# Patient Record
Sex: FEMALE | Race: WHITE | Employment: FULL TIME | URBAN - METROPOLITAN AREA
[De-identification: names, ages, dates, MRNs, and addresses within clinical notes are randomized per-mention and may not be internally consistent; named-entity substitution may affect disease eponyms.]

---

## 2019-02-06 NOTE — ED INITIAL ASSESSMENT (HPI)
Pt presents to ed via ems c/o anxiety with 3 episodes of vomiting this evening. Pt states she has hx of depression, drank alcohol last night and had a panic attack today.  Pt states she is an everyday drinker, states last drink was at 2200 yesterday evening

## 2019-02-06 NOTE — ED PROVIDER NOTES
Patient Seen in: [facility] Emergency Department    History   Patient presents with:   Anxiety/Panic attack (neurologic)  Nausea/Vomiting/Diarrhea (gastrointestinal)    Stated Complaint: panic attack, n/v    HPI    The patient is a 55-year-old female or homicidal ideation. She does not want to talk to the [facility] regarding detox tonight. No other past medical problems. No history of cardiac disease, PE or DVT.     Past Medical History:   Diagnosis Date   • Anxiety    • Depression        History reviewe and suprapubic regions. No CVA tenderness. Extremities: No deformity, nontender throughout, and normal active range of motion of all 4 extremities. Distal pulses normal and symmetric. No calf swelling, asymmetry, tenderness or cords.   Skin: No masses or type  Anxiety    Disposition:  Discharge  2/5/2019 10:15 pm    Follow-up:  [facility]  [address] [address] [phone]    Call in 1 day          Medications Prescribed:  Current Discharge Medication List    START romaine

## 2019-08-17 ENCOUNTER — OFFICE VISIT (OUTPATIENT)
Dept: OBGYN CLINIC | Facility: CLINIC | Age: 54
End: 2019-08-17
Payer: COMMERCIAL

## 2019-08-17 VITALS
WEIGHT: 190 LBS | TEMPERATURE: 98 F | SYSTOLIC BLOOD PRESSURE: 134 MMHG | HEART RATE: 90 BPM | RESPIRATION RATE: 16 BRPM | DIASTOLIC BLOOD PRESSURE: 100 MMHG | HEIGHT: 67 IN | BODY MASS INDEX: 29.82 KG/M2

## 2019-08-17 DIAGNOSIS — Z01.419 WELL WOMAN EXAM WITH ROUTINE GYNECOLOGICAL EXAM: Primary | ICD-10-CM

## 2019-08-17 DIAGNOSIS — R39.9 URINARY TRACT INFECTION SYMPTOMS: ICD-10-CM

## 2019-08-17 DIAGNOSIS — Z12.39 SCREENING FOR MALIGNANT NEOPLASM OF BREAST: ICD-10-CM

## 2019-08-17 DIAGNOSIS — N92.4 ABNORMAL PERIMENOPAUSAL BLEEDING: ICD-10-CM

## 2019-08-17 DIAGNOSIS — Z12.4 SCREENING FOR MALIGNANT NEOPLASM OF CERVIX: ICD-10-CM

## 2019-08-17 DIAGNOSIS — R03.0 ELEVATED BLOOD PRESSURE READING: ICD-10-CM

## 2019-08-17 LAB
GLUCOSE (URINE DIPSTICK): NEGATIVE MG/DL
MULTISTIX LOT#: NORMAL NUMERIC
NITRITE, URINE: NEGATIVE
PH, URINE: 7 (ref 4.5–8)
PROTEIN (URINE DIPSTICK): 100 MG/DL
SPECIFIC GRAVITY: 1.01 (ref 1–1.03)
UROBILINOGEN,SEMI-QN: 1 MG/DL (ref 0–1.9)

## 2019-08-17 PROCEDURE — 88175 CYTOPATH C/V AUTO FLUID REDO: CPT | Performed by: NURSE PRACTITIONER

## 2019-08-17 PROCEDURE — 87624 HPV HI-RISK TYP POOLED RSLT: CPT | Performed by: NURSE PRACTITIONER

## 2019-08-17 PROCEDURE — 87186 SC STD MICRODIL/AGAR DIL: CPT | Performed by: NURSE PRACTITIONER

## 2019-08-17 PROCEDURE — 87086 URINE CULTURE/COLONY COUNT: CPT | Performed by: NURSE PRACTITIONER

## 2019-08-17 PROCEDURE — 99386 PREV VISIT NEW AGE 40-64: CPT | Performed by: NURSE PRACTITIONER

## 2019-08-17 PROCEDURE — 81002 URINALYSIS NONAUTO W/O SCOPE: CPT | Performed by: NURSE PRACTITIONER

## 2019-08-17 PROCEDURE — 87088 URINE BACTERIA CULTURE: CPT | Performed by: NURSE PRACTITIONER

## 2019-08-17 NOTE — PROGRESS NOTES
Physical/pap/irreular bleeding. Spotting every day since 7/30/2019. PMS days she goes way down. Occ urinary frequency.

## 2019-08-18 LAB — HPV I/H RISK 1 DNA SPEC QL NAA+PROBE: NEGATIVE

## 2019-08-19 RX ORDER — NITROFURANTOIN 25; 75 MG/1; MG/1
100 CAPSULE ORAL 2 TIMES DAILY
Qty: 14 CAPSULE | Refills: 0 | Status: SHIPPED | OUTPATIENT
Start: 2019-08-19 | End: 2019-08-26

## 2019-10-26 ENCOUNTER — HOSPITAL ENCOUNTER (OUTPATIENT)
Dept: MAMMOGRAPHY | Facility: HOSPITAL | Age: 54
Discharge: HOME | End: 2019-10-26
Attending: FAMILY MEDICINE
Payer: COMMERCIAL

## 2019-10-26 DIAGNOSIS — Z12.39 SCREENING FOR MALIGNANT NEOPLASM OF BREAST: ICD-10-CM

## 2019-10-26 PROCEDURE — 77067 SCR MAMMO BI INCL CAD: CPT | Performed by: NURSE PRACTITIONER

## 2019-10-26 PROCEDURE — 77063 BREAST TOMOSYNTHESIS BI: CPT | Performed by: NURSE PRACTITIONER

## 2019-11-29 ENCOUNTER — OFFICE VISIT (OUTPATIENT)
Dept: FAMILY MEDICINE CLINIC | Facility: CLINIC | Age: 54
End: 2019-11-29
Payer: COMMERCIAL

## 2019-11-29 VITALS
WEIGHT: 193 LBS | HEIGHT: 67 IN | BODY MASS INDEX: 30.29 KG/M2 | HEART RATE: 70 BPM | SYSTOLIC BLOOD PRESSURE: 128 MMHG | TEMPERATURE: 98 F | RESPIRATION RATE: 16 BRPM | DIASTOLIC BLOOD PRESSURE: 78 MMHG

## 2019-11-29 DIAGNOSIS — Z23 NEED FOR VACCINATION: ICD-10-CM

## 2019-11-29 DIAGNOSIS — Z00.00 WELL WOMAN EXAM WITHOUT GYNECOLOGICAL EXAM: Primary | ICD-10-CM

## 2019-11-29 DIAGNOSIS — Z12.11 SCREEN FOR COLON CANCER: ICD-10-CM

## 2019-11-29 PROCEDURE — 99396 PREV VISIT EST AGE 40-64: CPT | Performed by: FAMILY MEDICINE

## 2019-11-29 PROCEDURE — 90471 IMMUNIZATION ADMIN: CPT | Performed by: FAMILY MEDICINE

## 2019-11-29 PROCEDURE — 90715 TDAP VACCINE 7 YRS/> IM: CPT | Performed by: FAMILY MEDICINE

## 2019-11-29 NOTE — PROGRESS NOTES
SUBJECTIVE:  Patient presents with:  Establish Care: New Pt  Physical: WWE no pap CAGE: 2/4    HPI:  Follows with Dr. Kathrine Olea at [facility]. Also sees Abran with [facility] (counseling). Notes that she struggles with alcohol overuse.      Questions if she should Allergies:  No Known Allergies    OBJECTIVE:  PHYSICAL EXAM:   11/29/19  1127   BP: 128/78   Pulse: 70   Resp: 16   Temp: 98.1 °F (36.7 °C)   TempSrc: Oral   Weight: 193 lb (87.5 kg)   Height: 67\"     Physical Examination: General appearance - alert, LIPID PANEL; Future  -     VITAMIN D, 25-HYDROXY; Future  -     TSH W REFLEX TO FREE T4; Future  -     HCV ANTIBODY;  Future  Vaccines: Indicated today:  -     IMMUNIZATION ADMINISTRATION  -     TETANUS, DIPHTHERIA TOXOIDS AND ACELLULAR PERTUSIS VACCINE (TD

## 2019-12-04 ENCOUNTER — LAB ENCOUNTER (OUTPATIENT)
Dept: LAB | Age: 54
End: 2019-12-04
Attending: FAMILY MEDICINE
Payer: COMMERCIAL

## 2019-12-04 DIAGNOSIS — D75.89 MACROCYTOSIS: ICD-10-CM

## 2019-12-04 DIAGNOSIS — Z00.00 WELL WOMAN EXAM WITHOUT GYNECOLOGICAL EXAM: ICD-10-CM

## 2019-12-04 PROCEDURE — 82607 VITAMIN B-12: CPT | Performed by: FAMILY MEDICINE

## 2019-12-04 PROCEDURE — 86803 HEPATITIS C AB TEST: CPT | Performed by: FAMILY MEDICINE

## 2019-12-04 PROCEDURE — 82306 VITAMIN D 25 HYDROXY: CPT | Performed by: FAMILY MEDICINE

## 2019-12-04 PROCEDURE — 36415 COLL VENOUS BLD VENIPUNCTURE: CPT | Performed by: FAMILY MEDICINE

## 2019-12-04 PROCEDURE — 80050 GENERAL HEALTH PANEL: CPT | Performed by: FAMILY MEDICINE

## 2019-12-04 PROCEDURE — 80061 LIPID PANEL: CPT | Performed by: FAMILY MEDICINE

## 2019-12-05 DIAGNOSIS — E55.9 VITAMIN D DEFICIENCY: ICD-10-CM

## 2019-12-05 DIAGNOSIS — D75.89 MACROCYTOSIS: Primary | ICD-10-CM

## 2019-12-05 RX ORDER — ERGOCALCIFEROL 1.25 MG/1
50000 CAPSULE ORAL WEEKLY
Qty: 12 CAPSULE | Refills: 0 | Status: SHIPPED | OUTPATIENT
Start: 2019-12-05 | End: 2021-10-14

## 2021-09-07 ENCOUNTER — PATIENT MESSAGE (OUTPATIENT)
Dept: FAMILY MEDICINE CLINIC | Facility: CLINIC | Age: 56
End: 2021-09-07

## 2021-09-07 DIAGNOSIS — H34.212 HOLLENHORST PLAQUE, LEFT EYE: Primary | ICD-10-CM

## 2021-09-07 NOTE — TELEPHONE ENCOUNTER
From: Giana Toro  To: Antwan Doyle DO  Sent: 9/7/2021 3:22 PM CDT  Subject: Non-Urgent Medical Question    My ophthalmologist spotted a Hollen Austen plaque in my left eye.  He suggested I contact my primary  to see if you think a carotid Doppler

## 2021-09-17 ENCOUNTER — TELEPHONE (OUTPATIENT)
Dept: FAMILY MEDICINE CLINIC | Facility: CLINIC | Age: 56
End: 2021-09-17

## 2021-09-17 DIAGNOSIS — Z00.00 ROUTINE GENERAL MEDICAL EXAMINATION AT A HEALTH CARE FACILITY: Primary | ICD-10-CM

## 2021-09-17 DIAGNOSIS — E55.9 VITAMIN D DEFICIENCY: ICD-10-CM

## 2021-09-17 NOTE — TELEPHONE ENCOUNTER
Please enter lab orders for the patient's upcoming physical appointment. Physical scheduled:    Your appointments     Date & Time Appointment Department [facility])    Oct 15, 2021  2:20 PM CDT Physical - Established with Martín Jaquez DO [facility]

## 2021-10-08 ENCOUNTER — LAB ENCOUNTER (OUTPATIENT)
Dept: LAB | Age: 56
End: 2021-10-08
Attending: FAMILY MEDICINE
Payer: COMMERCIAL

## 2021-10-08 DIAGNOSIS — E55.9 VITAMIN D DEFICIENCY: ICD-10-CM

## 2021-10-08 DIAGNOSIS — Z00.00 ROUTINE GENERAL MEDICAL EXAMINATION AT A HEALTH CARE FACILITY: ICD-10-CM

## 2021-10-08 PROCEDURE — 80050 GENERAL HEALTH PANEL: CPT | Performed by: FAMILY MEDICINE

## 2021-10-08 PROCEDURE — 80061 LIPID PANEL: CPT | Performed by: FAMILY MEDICINE

## 2021-10-08 PROCEDURE — 82306 VITAMIN D 25 HYDROXY: CPT | Performed by: FAMILY MEDICINE

## 2021-10-14 RX ORDER — DESVENLAFAXINE 100 MG/1
100 TABLET, EXTENDED RELEASE ORAL DAILY
COMMUNITY
Start: 2021-08-30

## 2021-10-15 ENCOUNTER — OFFICE VISIT (OUTPATIENT)
Dept: FAMILY MEDICINE CLINIC | Facility: CLINIC | Age: 56
End: 2021-10-15
Payer: COMMERCIAL

## 2021-10-15 VITALS
HEIGHT: 67 IN | DIASTOLIC BLOOD PRESSURE: 82 MMHG | RESPIRATION RATE: 16 BRPM | WEIGHT: 200.38 LBS | BODY MASS INDEX: 31.45 KG/M2 | TEMPERATURE: 97 F | HEART RATE: 96 BPM | SYSTOLIC BLOOD PRESSURE: 138 MMHG

## 2021-10-15 DIAGNOSIS — Z00.00 WELL WOMAN EXAM WITHOUT GYNECOLOGICAL EXAM: Primary | ICD-10-CM

## 2021-10-15 DIAGNOSIS — Z12.31 VISIT FOR SCREENING MAMMOGRAM: ICD-10-CM

## 2021-10-15 DIAGNOSIS — Z12.11 SCREEN FOR COLON CANCER: ICD-10-CM

## 2021-10-15 PROCEDURE — 3075F SYST BP GE 130 - 139MM HG: CPT | Performed by: FAMILY MEDICINE

## 2021-10-15 PROCEDURE — 99396 PREV VISIT EST AGE 40-64: CPT | Performed by: FAMILY MEDICINE

## 2021-10-15 PROCEDURE — 3008F BODY MASS INDEX DOCD: CPT | Performed by: FAMILY MEDICINE

## 2021-10-15 PROCEDURE — 3079F DIAST BP 80-89 MM HG: CPT | Performed by: FAMILY MEDICINE

## 2021-10-15 NOTE — PROGRESS NOTES
SUBJECTIVE:  Patient presents with: Well Adult: Annual physical    HPI:  Follows with Dr. Dian Borrego at [facility]. Also sees Abran with [address] [facility] (counseling). Notes that she struggles with alcohol overuse. Numbness in hands. R>L.  Happens with waking and thor papilloma virus infection       Past Surgical History:   Procedure Laterality Date   • COLPOSCOPY, CERVIX W/UPPER ADJACENT VAGINA; W/BIOPSY(S), CERVIX        Family History   Problem Relation Age of Onset   • Cancer Father         Prostate   • Heart Disord Relevant Orders    AYLA SCREENING BILAT (SFM=[id number])        Oc Rico was seen today for well adult. Diagnoses and all orders for this visit:    Well woman exam without gynecological exam  Vaccines: Indicated today: UTD  Obesity screening:  Body mass ind

## 2021-10-20 ENCOUNTER — LAB ENCOUNTER (OUTPATIENT)
Dept: LAB | Facility: HOSPITAL | Age: 56
End: 2021-10-20
Attending: FAMILY MEDICINE
Payer: COMMERCIAL

## 2021-10-20 DIAGNOSIS — Z12.11 SCREEN FOR COLON CANCER: ICD-10-CM

## 2021-10-20 PROCEDURE — 82274 ASSAY TEST FOR BLOOD FECAL: CPT

## 2021-11-23 ENCOUNTER — PATIENT MESSAGE (OUTPATIENT)
Dept: FAMILY MEDICINE CLINIC | Facility: CLINIC | Age: 56
End: 2021-11-23

## 2021-11-23 DIAGNOSIS — R20.0 NUMBNESS IN BOTH HANDS: Primary | ICD-10-CM

## 2021-11-23 NOTE — TELEPHONE ENCOUNTER
From: Kristian Francisco  To: [id number], DO  Sent: 11/23/2021 2:10 PM CST  Subject: Referral to orthopedic dr. Rito Tran I need your referral to see Dr. Sharon Briones for my carpel tunnel symptoms?  We discussed during my annual physical exam a couple of weeks

## 2021-11-24 NOTE — TELEPHONE ENCOUNTER
Referral request Dr. Caleb Antunez M.D. Office notes from Dr. Davis Doty, DO 10/15/21  Numbness in hands. R>L. Happens with waking and thorough out the day. Started using wrist splints and this did seem to improve symptoms. Denies weakness.

## 2021-12-01 ENCOUNTER — HOSPITAL ENCOUNTER (OUTPATIENT)
Dept: MAMMOGRAPHY | Age: 56
Discharge: HOME | End: 2021-12-01
Attending: FAMILY MEDICINE
Payer: COMMERCIAL

## 2021-12-01 DIAGNOSIS — Z12.31 VISIT FOR SCREENING MAMMOGRAM: ICD-10-CM

## 2021-12-01 PROCEDURE — 77067 SCR MAMMO BI INCL CAD: CPT | Performed by: FAMILY MEDICINE

## 2021-12-01 PROCEDURE — 77063 BREAST TOMOSYNTHESIS BI: CPT | Performed by: FAMILY MEDICINE

## 2021-12-17 ENCOUNTER — IMMUNIZATION (OUTPATIENT)
Dept: LAB | Facility: HOSPITAL | Age: 56
End: 2021-12-17
Attending: EMERGENCY MEDICINE
Payer: COMMERCIAL

## 2021-12-17 DIAGNOSIS — Z23 NEED FOR VACCINATION: Primary | ICD-10-CM

## 2021-12-17 PROCEDURE — 0064A SARSCOV2 VAC 50MCG/0.25ML IM: CPT

## 2022-08-19 ENCOUNTER — TELEPHONE (OUTPATIENT)
Dept: CASE MANAGEMENT | Age: 57
End: 2022-08-19

## 2022-08-19 DIAGNOSIS — Z12.11 COLON CANCER SCREENING: Primary | ICD-10-CM

## 2022-11-16 ENCOUNTER — TELEPHONE (OUTPATIENT)
Dept: FAMILY MEDICINE CLINIC | Facility: CLINIC | Age: 57
End: 2022-11-16

## 2022-11-16 NOTE — TELEPHONE ENCOUNTER
[facility] is following up on lab order for colon cancer screening that was mailed to pt. Please encourage pt to complete this test within the next 2 wks. Left message to call back.

## 2023-11-13 RX ORDER — DESVENLAFAXINE 50 MG/1
TABLET, EXTENDED RELEASE ORAL
COMMUNITY
End: 2023-11-13

## 2023-11-13 RX ORDER — DESVENLAFAXINE SUCCINATE 50 MG/1
50 TABLET, EXTENDED RELEASE ORAL DAILY
COMMUNITY
Start: 2023-09-14

## 2023-12-07 ENCOUNTER — OFFICE VISIT (OUTPATIENT)
Dept: FAMILY MEDICINE CLINIC | Facility: CLINIC | Age: 58
End: 2023-12-07
Payer: COMMERCIAL

## 2023-12-07 VITALS
DIASTOLIC BLOOD PRESSURE: 80 MMHG | HEART RATE: 88 BPM | WEIGHT: 197 LBS | SYSTOLIC BLOOD PRESSURE: 132 MMHG | BODY MASS INDEX: 31.66 KG/M2 | OXYGEN SATURATION: 96 % | HEIGHT: 66.2 IN | RESPIRATION RATE: 16 BRPM | TEMPERATURE: 97 F

## 2023-12-07 DIAGNOSIS — Z00.00 WELL ADULT EXAM: Primary | ICD-10-CM

## 2023-12-07 DIAGNOSIS — Z12.11 SCREENING FOR COLON CANCER: ICD-10-CM

## 2023-12-07 DIAGNOSIS — F33.9 EPISODE OF RECURRENT MAJOR DEPRESSIVE DISORDER, UNSPECIFIED DEPRESSION EPISODE SEVERITY (HCC): ICD-10-CM

## 2023-12-07 DIAGNOSIS — Z00.00 LABORATORY EXAMINATION ORDERED AS PART OF A COMPLETE PHYSICAL EXAMINATION: ICD-10-CM

## 2023-12-07 DIAGNOSIS — Z12.31 SCREENING MAMMOGRAM FOR BREAST CANCER: ICD-10-CM

## 2023-12-07 DIAGNOSIS — E66.09 CLASS 1 OBESITY DUE TO EXCESS CALORIES WITH BODY MASS INDEX (BMI) OF 31.0 TO 31.9 IN ADULT, UNSPECIFIED WHETHER SERIOUS COMORBIDITY PRESENT: ICD-10-CM

## 2023-12-07 PROCEDURE — 3008F BODY MASS INDEX DOCD: CPT | Performed by: PHYSICIAN ASSISTANT

## 2023-12-07 PROCEDURE — 3075F SYST BP GE 130 - 139MM HG: CPT | Performed by: PHYSICIAN ASSISTANT

## 2023-12-07 PROCEDURE — 3079F DIAST BP 80-89 MM HG: CPT | Performed by: PHYSICIAN ASSISTANT

## 2023-12-07 PROCEDURE — 99396 PREV VISIT EST AGE 40-64: CPT | Performed by: PHYSICIAN ASSISTANT

## 2023-12-21 ENCOUNTER — LAB ENCOUNTER (OUTPATIENT)
Dept: LAB | Age: 58
End: 2023-12-21
Attending: PHYSICIAN ASSISTANT
Payer: COMMERCIAL

## 2023-12-21 DIAGNOSIS — Z00.00 LABORATORY EXAMINATION ORDERED AS PART OF A COMPLETE PHYSICAL EXAMINATION: ICD-10-CM

## 2023-12-21 LAB
ALBUMIN SERPL-MCNC: 3.9 G/DL (ref 3.4–5)
ALBUMIN/GLOB SERPL: 1.1 {RATIO} (ref 1–2)
ALP LIVER SERPL-CCNC: 86 U/L
ALT SERPL-CCNC: 24 U/L
ANION GAP SERPL CALC-SCNC: 4 MMOL/L (ref 0–18)
AST SERPL-CCNC: 26 U/L (ref 15–37)
BASOPHILS # BLD AUTO: 0.05 X10(3) UL (ref 0–0.2)
BASOPHILS NFR BLD AUTO: 1 %
BILIRUB SERPL-MCNC: 1.2 MG/DL (ref 0.1–2)
BUN BLD-MCNC: 8 MG/DL (ref 9–23)
CALCIUM BLD-MCNC: 9.7 MG/DL (ref 8.5–10.1)
CHLORIDE SERPL-SCNC: 106 MMOL/L (ref 98–112)
CHOLEST SERPL-MCNC: 250 MG/DL (ref ?–200)
CO2 SERPL-SCNC: 28 MMOL/L (ref 21–32)
CREAT BLD-MCNC: 1.14 MG/DL
EGFRCR SERPLBLD CKD-EPI 2021: 56 ML/MIN/1.73M2 (ref 60–?)
EOSINOPHIL # BLD AUTO: 0.44 X10(3) UL (ref 0–0.7)
EOSINOPHIL NFR BLD AUTO: 8.7 %
ERYTHROCYTE [DISTWIDTH] IN BLOOD BY AUTOMATED COUNT: 11.9 %
FASTING PATIENT LIPID ANSWER: YES
FASTING STATUS PATIENT QL REPORTED: YES
GLOBULIN PLAS-MCNC: 3.7 G/DL (ref 2.8–4.4)
GLUCOSE BLD-MCNC: 98 MG/DL (ref 70–99)
HCT VFR BLD AUTO: 44 %
HDLC SERPL-MCNC: 81 MG/DL (ref 40–59)
HGB BLD-MCNC: 14.7 G/DL
IMM GRANULOCYTES # BLD AUTO: 0.01 X10(3) UL (ref 0–1)
IMM GRANULOCYTES NFR BLD: 0.2 %
LDLC SERPL CALC-MCNC: 157 MG/DL (ref ?–100)
LYMPHOCYTES # BLD AUTO: 1.63 X10(3) UL (ref 1–4)
LYMPHOCYTES NFR BLD AUTO: 32.1 %
MCH RBC QN AUTO: 33.7 PG (ref 26–34)
MCHC RBC AUTO-ENTMCNC: 33.4 G/DL (ref 31–37)
MCV RBC AUTO: 100.9 FL
MONOCYTES # BLD AUTO: 0.53 X10(3) UL (ref 0.1–1)
MONOCYTES NFR BLD AUTO: 10.4 %
NEUTROPHILS # BLD AUTO: 2.42 X10 (3) UL (ref 1.5–7.7)
NEUTROPHILS # BLD AUTO: 2.42 X10(3) UL (ref 1.5–7.7)
NEUTROPHILS NFR BLD AUTO: 47.6 %
NONHDLC SERPL-MCNC: 169 MG/DL (ref ?–130)
OSMOLALITY SERPL CALC.SUM OF ELEC: 284 MOSM/KG (ref 275–295)
PLATELET # BLD AUTO: 321 10(3)UL (ref 150–450)
POTASSIUM SERPL-SCNC: 5.1 MMOL/L (ref 3.5–5.1)
PROT SERPL-MCNC: 7.6 G/DL (ref 6.4–8.2)
RBC # BLD AUTO: 4.36 X10(6)UL
SODIUM SERPL-SCNC: 138 MMOL/L (ref 136–145)
TRIGL SERPL-MCNC: 73 MG/DL (ref 30–149)
TSI SER-ACNC: 1.19 MIU/ML (ref 0.36–3.74)
VIT D+METAB SERPL-MCNC: 19.1 NG/ML (ref 30–100)
VLDLC SERPL CALC-MCNC: 14 MG/DL (ref 0–30)
WBC # BLD AUTO: 5.1 X10(3) UL (ref 4–11)

## 2023-12-21 PROCEDURE — 80053 COMPREHEN METABOLIC PANEL: CPT

## 2023-12-21 PROCEDURE — 84443 ASSAY THYROID STIM HORMONE: CPT

## 2023-12-21 PROCEDURE — 82306 VITAMIN D 25 HYDROXY: CPT

## 2023-12-21 PROCEDURE — 85025 COMPLETE CBC W/AUTO DIFF WBC: CPT

## 2023-12-21 PROCEDURE — 80061 LIPID PANEL: CPT

## 2024-02-05 NOTE — PROGRESS NOTES
HPI:   Charlie Price is a 47year old No obstetric history on file. who presents for an annual gynecological exam.   Pt is having some urinary frequency for past 2 days. 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   Also went from January Symptoms for two weeks     - macrobid   - UA and urine culture    symptoms    EXAM:     VITALS: BP (!) 134/100 (BP Location: Left arm, Cuff Size: adult)   Pulse 90   Temp 97.7 °F (36.5 °C) (Oral)   Resp 16   Ht 67\"   Wt 190 lb   LMP 07/30/2019   BMI 29.76 kg/m²   GENERAL:  Well-appearing, no apparent distress, well nour encouraged baseline Dexa if menopausal.  · I encouraged pt to have yearly annual examinations with her PCP for management of general medical problems. · - I encouraged smoking cessation, if indicated.     Pt voiced understanding of all instructions; all qu

## 2024-08-14 ENCOUNTER — OFFICE VISIT (OUTPATIENT)
Dept: FAMILY MEDICINE CLINIC | Facility: CLINIC | Age: 59
End: 2024-08-14
Payer: COMMERCIAL

## 2024-08-14 VITALS
BODY MASS INDEX: 28 KG/M2 | TEMPERATURE: 97 F | RESPIRATION RATE: 16 BRPM | OXYGEN SATURATION: 98 % | HEART RATE: 84 BPM | DIASTOLIC BLOOD PRESSURE: 82 MMHG | WEIGHT: 177.38 LBS | SYSTOLIC BLOOD PRESSURE: 120 MMHG

## 2024-08-14 DIAGNOSIS — R42 DIZZINESS: Primary | ICD-10-CM

## 2024-08-14 DIAGNOSIS — E78.5 DYSLIPIDEMIA: ICD-10-CM

## 2024-08-14 DIAGNOSIS — I10 PRIMARY HYPERTENSION: ICD-10-CM

## 2024-08-14 PROCEDURE — 3079F DIAST BP 80-89 MM HG: CPT | Performed by: NURSE PRACTITIONER

## 2024-08-14 PROCEDURE — 3074F SYST BP LT 130 MM HG: CPT | Performed by: NURSE PRACTITIONER

## 2024-08-14 PROCEDURE — 99214 OFFICE O/P EST MOD 30 MIN: CPT | Performed by: NURSE PRACTITIONER

## 2024-08-14 RX ORDER — AMLODIPINE BESYLATE 5 MG/1
5 TABLET ORAL DAILY
Qty: 90 TABLET | Refills: 1 | Status: SHIPPED | OUTPATIENT
Start: 2024-08-14

## 2024-08-14 RX ORDER — ATORVASTATIN CALCIUM 20 MG/1
20 TABLET, FILM COATED ORAL NIGHTLY
Qty: 90 TABLET | Refills: 1 | Status: SHIPPED | OUTPATIENT
Start: 2024-08-14

## 2024-08-14 NOTE — PROGRESS NOTES
Chief Complaint   Patient presents with    ER F/U     Seen on 8/2 for Dizziness        HPI:  Presents for follow up of admission at [facility] on 8/5-8/6 for dizziness with finding lf HTN and dyslipidemia. [facility] notes and testing reviewed by me on Care Everywhere. CT head- \"No acute intracranial abnormality. No acute large territory infarction or intracranial hemorrhage. No intracranial mass or mass effect. Additional Findings: Intracranial atherosclerotic calcifications are present\".   CTA head/neck \"No large vessel occlusion, high-grade/flow-limiting stenosis or intracranial aneurysm. Mild intracranial atherosclerotic calcification is present in the bilateral internal carotid arteries without severe narrowing. Multilevel degenerative change of the spine and craniocervical junction without severe osseous spinal canal stenosis. Straightening of the normal cervical lordosis, nonspecific could be positional, degenerative and/or related to muscle spasm\". EKG NSR. CBC no anemia, elevated lymphs only. U/A negative for infection. Troponin normal x2. CMP with significantly elevated glucose. A1C was 4.9. Lipids elevated. Was started on amlodipine and atorvastatin.   In office today reports is taking medications as ordered and is feeling much better. Reports dizziness is resolved. Reports home BP readings since admission have been 120's/70's-80's. Denies chest pain, palpitations, SOB, CHRISTENSEN, headaches, dizziness, lightheadedness, visual changes. Denies N/T to any body part.     Past Medical History:    Anxiety    Depression    Human papilloma virus infection    Hyperlipidemia    Obesity       Patient Active Problem List   Diagnosis    Well woman exam with routine gynecological exam    Abnormal perimenopausal bleeding       Current Outpatient Medications   Medication Sig Dispense Refill    amLODIPine 5 MG Oral Tab Take 1 tablet (5 mg total) by mouth daily. 90 tablet 1    atorvastatin 20 MG Oral Tab Take 1 tablet (20 mg total) by mouth  nightly. 90 tablet 1    ergocalciferol 1.25 MG (49110 UT) Oral Cap Take 1 capsule (50,000 Units total) by mouth once a week. 12 capsule 0    desvenlafaxine ER 50 MG Oral Tablet 24 Hr Take 1 tablet (50 mg total) by mouth daily.      ALPRAZolam 0.5 MG Oral Tab Take 1 tablet (0.5 mg total) by mouth nightly as needed for Sleep.         Physical Exam  /82   Pulse 84   Temp 97 °F (36.1 °C)   Resp 16   Wt 177 lb 6.4 oz (80.5 kg)   SpO2 98%   BMI 28.46 kg/m²   Constitutional: well developed, well nourished, in no apparent distress  HEENT: Normocephalic and atraumatic. Tympanic membranes clear bilat. Oropharynx is pink and moist without lesions.  Eyes: Conjunctivae are pink and moist without exudate or drainage. EOMI. PERRLA.  Neuro: A/Ox3. Cranial nerves II-XII intact. Opposition intact. Hand grasp (=) bilat. No nystagmus noted. Follows commands appropriately. Speech fluid.  Neck: Normal range of motion. Neck supple, no JVD/bruits.   Cardiovascular: Normal rate, regular rhythm.  No murmur.   Pulmonary/Chest: No respiratory distress. Effort normal. Breath sounds clear bilaterally. No wheezes, rhonchi or rales  Skin: Skin is warm and dry. No rash noted. No erythema. No pallor.     A/P:    Encounter Diagnoses   Name Primary?    Dizziness- resolved. Notify office or return to ER if symptom returns.  Yes    Primary hypertension- Stable. Continue current management, refills provided.        Dyslipidemia- Stable. Continue current management, refills provided. Check labs in 3 months as ordered.           Orders Placed This Encounter   Procedures    Lipid Panel [E]    Comp Metabolic Panel (14) [E]       Meds & Refills for this Visit:  Requested Prescriptions     Signed Prescriptions Disp Refills    amLODIPine 5 MG Oral Tab 90 tablet 1     Sig: Take 1 tablet (5 mg total) by mouth daily.    atorvastatin 20 MG Oral Tab 90 tablet 1     Sig: Take 1 tablet (20 mg total) by mouth nightly.       Imaging & Consults:  None    No  follow-ups on file.  There are no Patient Instructions on file for this visit.    All questions were answered and the patient understands the plan.

## 2024-08-14 NOTE — PATIENT INSTRUCTIONS
Check blood pressure 2-3 times weekly at a different time of day each check.     Sit quietly for at least 5 minutes prior to checking blood pressure.    Sit up straight with both feet flat on the floor, with arm elevated to approximately the level of your heart.     Record blood pressure readings (date, time and result) and bring readings with you to next visit in our office.  If more than 30% of readings are over 140 on the top or 90 on the bottom, notify our office.     Repeat fasting blood work in 3 months.

## 2024-11-13 ENCOUNTER — OFFICE VISIT (OUTPATIENT)
Dept: FAMILY MEDICINE CLINIC | Facility: CLINIC | Age: 59
End: 2024-11-13
Payer: COMMERCIAL

## 2024-11-13 VITALS
OXYGEN SATURATION: 99 % | HEART RATE: 74 BPM | WEIGHT: 167 LBS | DIASTOLIC BLOOD PRESSURE: 68 MMHG | SYSTOLIC BLOOD PRESSURE: 114 MMHG | BODY MASS INDEX: 27 KG/M2 | RESPIRATION RATE: 18 BRPM

## 2024-11-13 DIAGNOSIS — R42 DIZZINESS: ICD-10-CM

## 2024-11-13 DIAGNOSIS — G89.29 CHRONIC NECK PAIN: ICD-10-CM

## 2024-11-13 DIAGNOSIS — I10 PRIMARY HYPERTENSION: Primary | ICD-10-CM

## 2024-11-13 DIAGNOSIS — Z12.11 SCREENING FOR COLON CANCER: ICD-10-CM

## 2024-11-13 DIAGNOSIS — Z12.31 ENCOUNTER FOR SCREENING MAMMOGRAM FOR MALIGNANT NEOPLASM OF BREAST: ICD-10-CM

## 2024-11-13 DIAGNOSIS — M54.2 CHRONIC NECK PAIN: ICD-10-CM

## 2024-11-13 DIAGNOSIS — E78.5 DYSLIPIDEMIA: ICD-10-CM

## 2024-11-13 PROCEDURE — 3078F DIAST BP <80 MM HG: CPT | Performed by: NURSE PRACTITIONER

## 2024-11-13 PROCEDURE — 99214 OFFICE O/P EST MOD 30 MIN: CPT | Performed by: NURSE PRACTITIONER

## 2024-11-13 PROCEDURE — 3074F SYST BP LT 130 MM HG: CPT | Performed by: NURSE PRACTITIONER

## 2024-11-13 RX ORDER — ATORVASTATIN CALCIUM 20 MG/1
20 TABLET, FILM COATED ORAL NIGHTLY
Qty: 90 TABLET | Refills: 1 | Status: SHIPPED | OUTPATIENT
Start: 2024-11-13

## 2024-11-13 RX ORDER — AMLODIPINE BESYLATE 5 MG/1
5 TABLET ORAL DAILY
Qty: 90 TABLET | Refills: 1 | Status: SHIPPED | OUTPATIENT
Start: 2024-11-13

## 2024-11-13 NOTE — PROGRESS NOTES
Chief Complaint   Patient presents with    Dizziness     F/u per pt is feeling better        HPI:  Presents for follow up of HTN/dyslipidemia with associated dizziness. Was seen in ER and then by me on 8/14/24. At time of visit dizziness had resolved and BP was to goal.   In office today reports as been taking amlodipine and atorvastatin as ordered, w/o side effects. Reports home BP readings are in the 120's/80's. Denies any further episodes of dizziness sinve last visit with me. Denies chest pain, palpitations, SOB, CHRISTENSEN, headaches, dizziness, lightheadedness, visual changes.     At ER visit was noted with abnormal CTA head/neck \"No large vessel occlusion, high-grade/flow-limiting stenosis or intracranial aneurysm. Mild intracranial atherosclerotic calcification is present in the bilateral internal carotid arteries without severe narrowing. Multilevel degenerative change of the spine and craniocervical junction without severe osseous spinal canal stenosis. Straightening of the normal cervical lordosis, nonspecific could be positional, degenerative and/or related to muscle spasm\". In office today reports does have chronic neck pain issues, mostly with turning head to left and right but not with flexion/extension of neck. Denies N/T to arms or other body parts.    Past Medical History:    Anxiety    Depression    Human papilloma virus infection    Hyperlipidemia    Obesity       Patient Active Problem List   Diagnosis    Well woman exam with routine gynecological exam    Abnormal perimenopausal bleeding       Current Outpatient Medications   Medication Sig Dispense Refill    amLODIPine 5 MG Oral Tab Take 1 tablet (5 mg total) by mouth daily. 90 tablet 1    atorvastatin 20 MG Oral Tab Take 1 tablet (20 mg total) by mouth nightly. 90 tablet 1    desvenlafaxine ER 50 MG Oral Tablet 24 Hr Take 1 tablet (50 mg total) by mouth daily.      ALPRAZolam 0.5 MG Oral Tab Take 1 tablet (0.5 mg total) by mouth nightly as needed for  Sleep.         Physical Exam  /68   Pulse 74   Resp 18   Wt 167 lb (75.8 kg)   SpO2 99%   BMI 26.79 kg/m²   Constitutional: well developed, well nourished, in no apparent distress  HEENT: Normocephalic and atraumatic.  Eyes: Conjunctivae are pink and moist without exudate or drainage. EOMI. PERRLA.  Neuro: A/Ox3. Cranial nerves II-XII intact. Follows commands appropriately. Speech fluid.  Neck: Normal range of motion. Neck supple w/o masses or deformity.   Cardiovascular: Normal rate, regular rhythm.  No murmur.   Pulmonary/Chest: No respiratory distress. Effort normal. Breath sounds clear bilaterally. No wheezes, rhonchi or rales  Skin: Skin is warm and dry. No rash noted. No erythema. No pallor.     A/P:    Encounter Diagnoses   Name Primary?    Primary hypertension- Stable. Continue current management, refills provided.    Yes    Dyslipidemia- Stable. Continue current management, refills provided. Complete labs tomorrow or after, as previously ordered.        Dizziness- resolved. Notify office if symptoms return.        Chronic neck pain- trial PT. If no improvement, notify office and I will place referral to [facility]. Verbalized understanding of instructions and agreeable to this plan of care.        Encounter for screening mammogram for malignant neoplasm of breast- check mammogram.        Screening for colon cancer- check FIT testing.         Orders Placed This Encounter   Procedures    Occult Blood, Fecal, Immunoassay (Blue cards) [E]       Meds & Refills for this Visit:  Requested Prescriptions     Signed Prescriptions Disp Refills    amLODIPine 5 MG Oral Tab 90 tablet 1     Sig: Take 1 tablet (5 mg total) by mouth daily.    atorvastatin 20 MG Oral Tab 90 tablet 1     Sig: Take 1 tablet (20 mg total) by mouth nightly.       Imaging & Consults:  OP REFERRAL TO [facility]  [facility]KA 2D+3D SCREENING BILAT (CPT=77067/02714)    No follow-ups on file.  Patient Instructions                            Please complete blood work as ordered. Do blood work fasting- no food or drink except for plain water- for 8 hours prior to testing. Ideally, labs would be done early in the morning.   You can call [phone] to schedule testing or it can be scheduled via the [employer] kelsey.       All questions were answered and the patient understands the plan.

## 2024-11-13 NOTE — PATIENT INSTRUCTIONS
Please complete blood work as ordered. Do blood work fasting- no food or drink except for plain water- for 8 hours prior to testing. Ideally, labs would be done early in the morning.   You can call [phone] to schedule testing or it can be scheduled via the [employer] kelsey.

## 2024-12-14 ENCOUNTER — HOSPITAL ENCOUNTER (OUTPATIENT)
Dept: MAMMOGRAPHY | Age: 59
Discharge: HOME | End: 2024-12-14
Attending: NURSE PRACTITIONER
Payer: COMMERCIAL

## 2024-12-14 DIAGNOSIS — Z12.31 ENCOUNTER FOR SCREENING MAMMOGRAM FOR MALIGNANT NEOPLASM OF BREAST: ICD-10-CM

## 2024-12-14 PROCEDURE — 77063 BREAST TOMOSYNTHESIS BI: CPT | Performed by: NURSE PRACTITIONER

## 2024-12-14 PROCEDURE — 77067 SCR MAMMO BI INCL CAD: CPT | Performed by: NURSE PRACTITIONER

## 2025-03-17 RX ORDER — AMLODIPINE BESYLATE 5 MG/1
5 TABLET ORAL DAILY
Qty: 90 TABLET | Refills: 1 | Status: SHIPPED | OUTPATIENT
Start: 2025-03-17

## 2025-03-17 NOTE — TELEPHONE ENCOUNTER
Refill request for:    Requested Prescriptions     Pending Prescriptions Disp Refills    AMLODIPINE 5 MG Oral Tab [Pharmacy Med Name: AMLODIPINE BESYLATE 5 MG TAB] 90 tablet 1     Sig: TAKE 1 TABLET (5 MG TOTAL) BY MOUTH DAILY.        Hypertension Medications Protocol [id number]/2025 07:38 PM   Protocol Details CMP or BMP in past 12 months    EGFRCR or GFRNAA > 50    Last BP reading less than 140/90    In person appointment or virtual visit in the past 12 mos or appointment in next 3 mos    Medication is active on med list          Last Prescribed Quantity Refills   02/28/2025 90 1     LOV 11/13/2024     Patient was asked to follow-up in: Follow-up not documented in note    Appointment scheduled: Visit date not found    Medication not on protocol.     # 90 with 1 refills routed to BRISSA Vail for review

## 2025-05-29 NOTE — TELEPHONE ENCOUNTER
Requested Prescriptions     Pending Prescriptions Disp Refills    ATORVASTATIN 20 MG Oral Tab [Pharmacy Med Name: ATORVASTATIN 20 MG TABLET] 90 tablet 1     Sig: TAKE 1 TABLET BY MOUTH EVERY DAY AT NIGHT      Refilled per protocol/OV notes    Failed protocol

## 2025-05-30 RX ORDER — ATORVASTATIN CALCIUM 20 MG/1
20 TABLET, FILM COATED ORAL NIGHTLY
Qty: 90 TABLET | Refills: 0 | Status: SHIPPED | OUTPATIENT
Start: 2025-05-30

## 2025-05-30 NOTE — TELEPHONE ENCOUNTER
90 day refill provided but patient needs to complete labs as ordered in November. No future refills can be provided without blood work. Please remind her to complete labs. Thanks.

## 2025-07-14 ENCOUNTER — PATIENT OUTREACH (OUTPATIENT)
Dept: FAMILY MEDICINE CLINIC | Facility: CLINIC | Age: 60
End: 2025-07-14

## 2025-08-01 ENCOUNTER — OFFICE VISIT (OUTPATIENT)
Dept: FAMILY MEDICINE CLINIC | Facility: CLINIC | Age: 60
End: 2025-08-01

## 2025-08-01 VITALS
RESPIRATION RATE: 18 BRPM | OXYGEN SATURATION: 98 % | HEIGHT: 67 IN | WEIGHT: 144 LBS | BODY MASS INDEX: 22.6 KG/M2 | DIASTOLIC BLOOD PRESSURE: 72 MMHG | HEART RATE: 82 BPM | SYSTOLIC BLOOD PRESSURE: 130 MMHG

## 2025-08-01 DIAGNOSIS — I10 PRIMARY HYPERTENSION: Primary | ICD-10-CM

## 2025-08-01 DIAGNOSIS — E78.5 DYSLIPIDEMIA: ICD-10-CM

## 2025-08-01 PROBLEM — N92.4 ABNORMAL PERIMENOPAUSAL BLEEDING: Status: RESOLVED | Noted: 2019-08-17 | Resolved: 2025-08-01

## 2025-08-01 PROBLEM — Z01.419 WELL WOMAN EXAM WITH ROUTINE GYNECOLOGICAL EXAM: Status: RESOLVED | Noted: 2019-08-17 | Resolved: 2025-08-01

## 2025-08-01 PROCEDURE — 99214 OFFICE O/P EST MOD 30 MIN: CPT | Performed by: NURSE PRACTITIONER

## (undated) NOTE — ED AVS SNAPSHOT
Townsend Don   MRN: [id number]    Department:  [facility] Emergency Department   Date of Visit:  2/5/2019           Disclosure     Insurance plans vary and the physician(s) referred by the ER may not be covered by your plan.  Please contact you tell this physician (or your personal doctor if your instructions are to return to your personal doctor) about any new or lasting problems. The primary care or specialist physician will see patients referred from the [facility] Emergency Department.  Arthor Bernheim